# Patient Record
Sex: MALE | Race: WHITE | NOT HISPANIC OR LATINO | Employment: FULL TIME | URBAN - METROPOLITAN AREA
[De-identification: names, ages, dates, MRNs, and addresses within clinical notes are randomized per-mention and may not be internally consistent; named-entity substitution may affect disease eponyms.]

---

## 2023-05-02 ENCOUNTER — HOSPITAL ENCOUNTER (EMERGENCY)
Facility: HOSPITAL | Age: 48
Discharge: HOME | End: 2023-05-02
Attending: EMERGENCY MEDICINE
Payer: COMMERCIAL

## 2023-05-02 VITALS
RESPIRATION RATE: 18 BRPM | WEIGHT: 215 LBS | DIASTOLIC BLOOD PRESSURE: 76 MMHG | OXYGEN SATURATION: 96 % | BODY MASS INDEX: 29.12 KG/M2 | SYSTOLIC BLOOD PRESSURE: 119 MMHG | TEMPERATURE: 98 F | HEART RATE: 61 BPM | HEIGHT: 72 IN

## 2023-05-02 DIAGNOSIS — R42 DIZZINESS: ICD-10-CM

## 2023-05-02 DIAGNOSIS — R42 DIZZY: ICD-10-CM

## 2023-05-02 DIAGNOSIS — R07.9 CHEST PAIN: ICD-10-CM

## 2023-05-02 DIAGNOSIS — R07.9 CHEST PAIN, UNSPECIFIED TYPE: Primary | ICD-10-CM

## 2023-05-02 LAB
ALBUMIN SERPL-MCNC: 3.7 G/DL (ref 3.3–5.5)
ALP SERPL-CCNC: 95 U/L (ref 42–141)
BILIRUB SERPL-MCNC: 0.6 MG/DL (ref 0.2–1.6)
BUN SERPL-MCNC: 13 MG/DL (ref 7–22)
CALCIUM SERPL-MCNC: 9.4 MG/DL (ref 8–10.3)
CHLORIDE SERPL-SCNC: 106 MMOL/L (ref 98–108)
CREAT SERPL-MCNC: 1.1 MG/DL (ref 0.6–1.2)
GLUCOSE SERPL-MCNC: 82 MG/DL (ref 73–118)
POC ALT (SGPT): 24 U/L (ref 10–47)
POC AST (SGOT): 30 U/L (ref 11–38)
POC CARDIAC TROPONIN I: 0 NG/ML (ref 0–0.08)
POC CARDIAC TROPONIN I: 0 NG/ML (ref 0–0.08)
POC PTINR: 1.1 (ref 0.9–1.2)
POC PTWBT: 13 SEC (ref 9.7–14.3)
POC TCO2: 28 MMOL/L (ref 18–33)
POTASSIUM BLD-SCNC: 4 MMOL/L (ref 3.6–5.1)
PROTEIN, POC: 6.2 G/DL (ref 6.4–8.1)
SAMPLE: NORMAL
SODIUM BLD-SCNC: 139 MMOL/L (ref 128–145)

## 2023-05-02 PROCEDURE — 93005 ELECTROCARDIOGRAM TRACING: CPT | Mod: ER

## 2023-05-02 PROCEDURE — 99285 EMERGENCY DEPT VISIT HI MDM: CPT | Mod: 25,ER

## 2023-05-02 PROCEDURE — 93010 EKG 12-LEAD: ICD-10-PCS | Mod: ,,, | Performed by: INTERNAL MEDICINE

## 2023-05-02 PROCEDURE — 84484 ASSAY OF TROPONIN QUANT: CPT | Mod: ER

## 2023-05-02 PROCEDURE — 85025 COMPLETE CBC W/AUTO DIFF WBC: CPT | Mod: ER

## 2023-05-02 PROCEDURE — 93010 ELECTROCARDIOGRAM REPORT: CPT | Mod: ,,, | Performed by: INTERNAL MEDICINE

## 2023-05-02 PROCEDURE — 85610 PROTHROMBIN TIME: CPT | Mod: ER

## 2023-05-02 PROCEDURE — 25000003 PHARM REV CODE 250: Mod: ER | Performed by: PHYSICIAN ASSISTANT

## 2023-05-02 PROCEDURE — 80053 COMPREHEN METABOLIC PANEL: CPT | Mod: ER

## 2023-05-02 RX ORDER — ASPIRIN 325 MG
325 TABLET ORAL
Status: COMPLETED | OUTPATIENT
Start: 2023-05-02 | End: 2023-05-02

## 2023-05-02 RX ADMIN — ASPIRIN 325 MG ORAL TABLET 325 MG: 325 PILL ORAL at 05:05

## 2023-05-02 NOTE — FIRST PROVIDER EVALUATION
Emergency Department TeleTriage Encounter Note      CHIEF COMPLAINT    Chief Complaint   Patient presents with    Irregular Heart Beat     REPORTS SENT FROM URGENT CARE FOR IRREG HEART RATE       VITAL SIGNS   Initial Vitals [05/02/23 1632]   BP Pulse Resp Temp SpO2   132/75 75 19 98.1 °F (36.7 °C) 96 %      MAP       --            ALLERGIES    Review of patient's allergies indicates:  No Known Allergies    PROVIDER TRIAGE NOTE  Patient presents with intermittent chest pain, shortness of breath and palpitations. Sent to the ED from urgent care.       ORDERS  Labs Reviewed - No data to display    ED Orders (720h ago, onward)      Start Ordered     Status Ordering Provider    05/02/23 1633 05/02/23 1632  EKG 12-lead  Once         Completed by PONCHO WILLIAMSON on 5/2/2023 at  4:38 PM [employer] CONTRERAS              Virtual Visit Note: The provider triage portion of this emergency department evaluation and documentation was performed via [employer], a HIPAA-compliant telemedicine application, in concert with a tele-presenter in the room. A face to face patient evaluation with one of my colleagues will occur once the patient is placed in an emergency department room.      DISCLAIMER: This note was prepared with [employer] voice recognition transcription software. Garbled syntax, mangled pronouns, and other bizarre constructions may be attributed to that software system.

## 2023-05-02 NOTE — DISCHARGE INSTRUCTIONS
Rest. Drink plenty of fluids to stay well hydrated. Follow up with your doctor if your symptoms continue.

## 2023-05-02 NOTE — ED PROVIDER NOTES
"Encounter Date: 5/2/2023    SCRIBE #1 NOTE: I, Grazyna Chan, am scribing for, and in the presence of,  Beatrice Graham MD. I have scribed the following portions of the note - Other sections scribed: HPI; ROS; PE.     History     Chief Complaint   Patient presents with    Irregular Heart Beat     REPORTS SENT FROM URGENT CARE FOR IRREG HEART RATE     Gian Haskins is a 47 y.o. male with no pertinent medical Hx who presents to the ED for chief complaint of dizziness, chest pain, and shortness of breath onset today. Associated light-headedness, dry mouth, and chills. Patient reports he went to Urgent Care prior to arrival and was told his heart rate was abnormal on his EKG and should come to the ED. He states he was driving at the time his symptoms began. Patient notes "I see normal then black like I'm going to fall over." He states the dizziness is not as strong as it was earlier. Patient reports he was clammy earlier. He denies associated fever. No further complaints at this time. Patient does not have any medical allergies.       The history is provided by the patient. No  was used.   Review of patient's allergies indicates:  No Known Allergies  Past Medical History:   Diagnosis Date    MVP (mitral valve prolapse)      Past Surgical History:   Procedure Laterality Date    EYE SURGERY      HERNIA REPAIR      vesectomy       Family History   Problem Relation Age of Onset    Heart disease Mother     Diabetes Mother     Cancer Mother         uterine    Asthma Sister      Social History     Tobacco Use    Smoking status: Never   Substance Use Topics    Alcohol use: Yes     Alcohol/week: 1.0 standard drink     Types: 1 Glasses of wine per week     Comment: occasional     Drug use: No     Review of Systems   Constitutional:  Positive for chills. Negative for activity change, appetite change and fever.   HENT:  Negative for congestion, rhinorrhea, sneezing and sore throat.    Respiratory:  Negative " for cough, chest tightness, shortness of breath and wheezing.    Cardiovascular:  Negative for chest pain and palpitations.   Gastrointestinal:  Negative for abdominal pain, diarrhea, nausea and vomiting.   Skin:  Negative for rash.   Neurological:  Positive for dizziness and light-headedness. Negative for headaches.   All other systems reviewed and are negative.    Physical Exam     Initial Vitals [05/02/23 1632]   BP Pulse Resp Temp SpO2   132/75 75 19 98.1 °F (36.7 °C) 96 %      MAP       --         Physical Exam    Nursing note and vitals reviewed.  Constitutional: He appears well-developed and well-nourished. No distress.   HENT:   Head: Normocephalic and atraumatic.   Eyes: Conjunctivae are normal.   Neck:   Normal range of motion.  Cardiovascular:  Normal rate and regular rhythm.           No murmur heard.  Pulmonary/Chest: Breath sounds normal. No respiratory distress.   Abdominal: Bowel sounds are normal. He exhibits no distension. There is no abdominal tenderness.   Musculoskeletal:         General: No tenderness or edema. Normal range of motion.      Cervical back: Normal range of motion.     Neurological: He is alert and oriented to person, place, and time.   Skin: Skin is warm and dry. No rash noted.   Psychiatric: He has a normal mood and affect. His behavior is normal.       ED Course   Procedures  Labs Reviewed   POCT CMP - Abnormal; Notable for the following components:       Result Value    Protein, POC 6.2 (*)     All other components within normal limits   TROPONIN ISTAT   POCT CBC   POCT CMP   POCT PROTIME-INR   POCT TROPONIN   ISTAT PROCEDURE   POCT TROPONIN       EKG Readings: (Independently Interpreted)   Initial Reading: No STEMI. Rhythm: Normal Sinus Rhythm. Heart Rate: 67. Ectopy: No Ectopy. Conduction: Normal. ST Segments: Normal ST Segments. T Waves: Normal. Clinical Impression: Normal Sinus Rhythm Other Impression: independently interpreted by me     Imaging Results              CT Head  Without Contrast (Final result)  Result time 05/02/23 18:25:01      Final result by Errol Chinchilla MD (05/02/23 18:25:01)                   Impression:      No acute intracranial abnormalities identified.      Electronically signed by: Errol Chinchilla MD  Date:    05/02/2023  Time:    18:25               Narrative:    EXAMINATION:  CT HEAD WITHOUT CONTRAST    CLINICAL HISTORY:  Dizziness, persistent/recurrent, cardiac or vascular cause suspected;    TECHNIQUE:  Low dose axial images were obtained through the head.  Coronal and sagittal reformations were also performed. Contrast was not administered.  CT head from October 2011.    COMPARISON:  None.    FINDINGS:  No evidence of acute/recent major vascular distribution cerebral infarction, intraparenchymal hemorrhage, or intra-axial space occupying lesion. The ventricular system is normal in size and configuration with no evidence of hydrocephalus. No effacement of the skull-base cisterns. No abnormal extra-axial fluid collections or blood products. Visualized paranasal sinuses and mastoid air cells are clear. The calvarium shows no significant abnormality.                                       X-Ray Chest PA And Lateral (Final result)  Result time 05/02/23 17:36:17      Final result by Errol Chinchilla MD (05/02/23 17:36:17)                   Impression:      No acute cardiopulmonary process identified.      Electronically signed by: Errol Chinchilla MD  Date:    05/02/2023  Time:    17:36               Narrative:    EXAMINATION:  XR CHEST PA AND LATERAL    CLINICAL HISTORY:  Chest Pain;    TECHNIQUE:  PA and lateral views of the chest were performed.    COMPARISON:  June 2014.    FINDINGS:  Cardiac silhouette is normal in size.  Lungs are symmetrically expanded.  No evidence of focal consolidative process, pneumothorax, or significant pleural effusion.  No acute osseous abnormality identified.                                       Medications   aspirin tablet 325  mg (325 mg Oral Given 5/2/23 1740)     Medical Decision Making:   History:   Old Medical Records: I decided to obtain old medical records.  Independently Interpreted Test(s):   I have ordered and independently interpreted X-rays - see prior notes.  I have ordered and independently interpreted EKG Reading(s) - see prior notes  Clinical Tests:   Lab Tests: Ordered and Reviewed  Radiological Study: Reviewed and Ordered  Medical Tests: Ordered and Reviewed  ED Management:  47 y.o. male presents with dizziness, chest pain, and shortness of breath onset today with associated light-headedness and chills. On exam, no fever, no abnormal findings. EKG with no arrhythmia and no acute ischemic changes. First Troponin negative. Labs with no anemia, dehydration, ARTURO. Head CT with no hemorrhage, mass or edema. Will check second troponin - if normal, will discharge. Case discussed with Dr. Wheeler who will follow up on results.    Other:   I have discussed this case with another health care provider.        Scribe Attestation:   Scribe #1: I performed the above scribed service and the documentation accurately describes the services I performed. I attest to the accuracy of the note.            I, Dr. Beatrice Graham, personally performed the services described in this documentation.   All medical record entries made by the scribe were at my direction and in my presence.   I have reviewed the chart and agree that the record is accurate and complete.   Beatrice Graham MD.  5:57 PM 05/02/2023          Clinical Impression:   Final diagnoses:  [R07.9] Chest pain  [R07.9] Chest pain, unspecified type (Primary)  [R42] Dizziness        ED Disposition Condition    Discharge Stable          ED Prescriptions    None       Follow-up Information       Follow up With Specialties Details Why Contact Info    Ijeoma Carrillo MD Internal Medicine, Physical Medicine and Rehabilitation  As needed [address] [facility] [address]  [phone]                Beatrice Graham MD  05/02/23 2623